# Patient Record
(demographics unavailable — no encounter records)

---

## 2022-12-21 NOTE — XR
10n EXAMINATION TYPE: XR wrist complete RT

 

DATE OF EXAM: 12/21/2022

 

CLINICAL HISTORY: pain

 

TECHNIQUE:  Frontal, lateral and oblique images of the right wrist are obtained. Navicular views also
 obtained.

 

COMPARISON: None.

 

FINDINGS:  

There is no acute fracture/dislocation evident. The joint spaces  appear within normal limits.  The o
verlying soft tissue appears unremarkable.

 

IMPRESSION:  

There is no acute fracture or dislocation seen.

 

ICD 10 NO FRACTURE, INITIAL EVALUATION

## 2022-12-21 NOTE — XR
EXAMINATION TYPE: XR hand complete RT

 

DATE OF EXAM: 12/21/2022

 

COMPARISON: NONE

 

HISTORY: Pain

 

TECHNIQUE: Three views are submitted.

 

FINDINGS:

The osseous structures are intact.  The joint spaces are preserved and there is no acute fracture or 
dislocation.  

 

IMPRESSION:

1.  No definite acute fracture or dislocation if symptoms persist, follow-up study in 7 to 10 days wo
uld be suggested

## 2022-12-28 NOTE — XR
EXAMINATION TYPE: XR wrist complete RT

 

DATE OF EXAM: 12/28/2022

 

COMPARISON: 12/21/2022

 

HISTORY: Fall 8 days prior

 

TECHNIQUE: 4 view right wrist

 

FINDINGS: No acute displaced fractures evident. No subacute fractures evident. Joint spaces appear pr
eserved. Mild diffuse soft tissue swelling may be present, similar to prior exam. Distal radius appea
rs stable.

 

IMPRESSION:

1.  No acute or subacute fracture evident.

2. If additional evaluation would be of benefit, consider MRI.